# Patient Record
(demographics unavailable — no encounter records)

---

## 2024-10-11 NOTE — HISTORY OF PRESENT ILLNESS
[Stable] : stable [FreeTextEntry1] : 11-year-old male presents with his father for evaluation of right knee pain.  The patient states he has been having pain for approximately 3 weeks.  He denies any injury that started the pain.  He is involved in soccer but denies any injury.  He states the pain is present with prolonged walking and running activity.  He occasionally has stopped the activity due to pain but remains active for the most part.  He denies any swelling of the knee.  He denies any instability or mechanical symptoms.  He has not done any treatment for it at this time.  He is here for the first time for evaluation.  He denies any other joint pain.

## 2024-10-11 NOTE — REASON FOR VISIT
[Initial Evaluation] : an initial evaluation [Patient] : patient [Father] : father [FreeTextEntry1] : right knee pain

## 2024-10-11 NOTE — PHYSICAL EXAM
[FreeTextEntry1] : GAIT: No limp. Good coordination and balance noted. GENERAL: alert, cooperative pleasant young  12 yo male in NAD SKIN: The skin is intact, warm, pink and dry over the area examined. EYES: Normal conjunctiva, normal eyelids and pupils were equal and round. ENT: normal ears, normal nose and normal lips. CARDIOVASCULAR: brisk capillary refill, but no peripheral edema. RESPIRATORY: The patient is in no apparent respiratory distress. They're taking full deep breaths without use of accessory muscles or evidence of audible wheezes or stridor without the use of a stethoscope. Normal respiratory effort. ABDOMEN: not examined   Hips: full symmetrical ROM without tenderness elicited.  right Knee: No effusion noted. No STS, erythema or warmth noted. Able to SLR without lag. Full range of motion of the knee. Mild tenderness inferior pole of the patella. +patellar tilt.  No instability to varus/valgus stress.   Negative Elvira's, Lachman and Anterior/posterior drawer with firm endpoint. No joint line tenderness. Negative patella apprehension. Negative patella grind test.  No calf tenderness ankle: full ROM without instability to stress. No tenderness or STS.  Distal motor 5/5 sensation grossly intact brisk cap refill

## 2024-10-11 NOTE — REVIEW OF SYSTEMS
[Joint Pains] : arthralgias [Appropriate Age Development] : development appropriate for age [Change in Activity] : no change in activity [Rash] : no rash [Heart Problems] : no heart problems [Limping] : no limping [Joint Swelling] : no joint swelling [Sleep Disturbances] : ~T no sleep disturbances

## 2024-10-11 NOTE — ASSESSMENT
[FreeTextEntry1] : SLJ right knee Patellofemoral tracking issues right  The history for today's visit was obtained from the child, as well as the parent. The child's history was unreliable alone due to age and therefore, the parent was used today as an independent historian. The clinical exam was discussed with father and patient. He is having some pain on the inferior pole of the patella which is usually an overuse type issue and patellofemoral maltracking. PT can be helpful to help strengthen the area and improve pain. Rx was given He can participate in activity as tolerated. Strap bracing vs patellar sleeve bracing discussed. If there is no improvement with PT or worsening of symptoms, he will f/u with us and further imaging may be considered. All questions answered. Parent in agreement with the plan. IAdelita, MPAS, PAC, have acted as a scribe and documented the above for Dr. Oneill.  The above documentation completed by the scribe is an accurate record of both my words and actions.  JPD

## 2024-10-11 NOTE — PHYSICAL EXAM
[FreeTextEntry1] : GAIT: No limp. Good coordination and balance noted. GENERAL: alert, cooperative pleasant young  10 yo male in NAD SKIN: The skin is intact, warm, pink and dry over the area examined. EYES: Normal conjunctiva, normal eyelids and pupils were equal and round. ENT: normal ears, normal nose and normal lips. CARDIOVASCULAR: brisk capillary refill, but no peripheral edema. RESPIRATORY: The patient is in no apparent respiratory distress. They're taking full deep breaths without use of accessory muscles or evidence of audible wheezes or stridor without the use of a stethoscope. Normal respiratory effort. ABDOMEN: not examined   Hips: full symmetrical ROM without tenderness elicited.  right Knee: No effusion noted. No STS, erythema or warmth noted. Able to SLR without lag. Full range of motion of the knee. Mild tenderness inferior pole of the patella. +patellar tilt.  No instability to varus/valgus stress.   Negative Elvira's, Lachman and Anterior/posterior drawer with firm endpoint. No joint line tenderness. Negative patella apprehension. Negative patella grind test.  No calf tenderness ankle: full ROM without instability to stress. No tenderness or STS.  Distal motor 5/5 sensation grossly intact brisk cap refill